# Patient Record
Sex: MALE | Race: OTHER | Employment: UNEMPLOYED
[De-identification: names, ages, dates, MRNs, and addresses within clinical notes are randomized per-mention and may not be internally consistent; named-entity substitution may affect disease eponyms.]

---

## 2023-07-24 ENCOUNTER — NURSE TRIAGE (OUTPATIENT)
Dept: OTHER | Facility: CLINIC | Age: 33
End: 2023-07-24

## 2023-07-24 NOTE — TELEPHONE ENCOUNTER
Received call from [address] [place], [place] [address]gokul Ott at [facility], caller not on line. Complaint: chest pain 1 month     Practice Name: [facility] Patient    Caller's telephone number verified as [phone]    Connected with caller via phone, please see below triage    Location of patient: [place]    Subjective: Caller states \"More like chest discomfort. It feels like something is crawling in my chest. I just being laying in the bed and it feels like the heart skips a beat. \"     Current Symptoms: chest discomfort     Onset: 1 month ago;     Associated Symptoms: NA    Pain Severity: 7/10; N/A; intermittent    Temperature: denies     What has been tried: nothing    LMP: NA Pregnant: NA    Recommended disposition: Go to ED Now    Care advice provided, patient verbalizes understanding; denies any other questions or concerns; instructed to call back for any new or worsening symptoms. Patient/caller agrees to proceed to nearest Emergency Department    Attention Provider: Thank you for allowing me to participate in the care of your patient. The patient was connected to triage in response to information provided to the E[facility]. Please do not respond through this encounter as the response is not directed to a shared pool.         Reason for Disposition   Heart beating irregularly or very rapidly    Protocols used: Chest Pain-ADULT-[place]